# Patient Record
Sex: FEMALE | Race: WHITE
[De-identification: names, ages, dates, MRNs, and addresses within clinical notes are randomized per-mention and may not be internally consistent; named-entity substitution may affect disease eponyms.]

---

## 2018-04-19 ENCOUNTER — HOSPITAL ENCOUNTER (OUTPATIENT)
Dept: HOSPITAL 92 - LABBT | Age: 28
Discharge: HOME | End: 2018-04-19
Attending: SURGERY
Payer: COMMERCIAL

## 2018-04-19 DIAGNOSIS — Z01.810: Primary | ICD-10-CM

## 2018-04-19 DIAGNOSIS — K80.20: ICD-10-CM

## 2018-04-19 LAB
ALBUMIN SERPL BCG-MCNC: 4.2 G/DL (ref 3.5–5)
ALP SERPL-CCNC: 45 U/L (ref 40–150)
ALT SERPL W P-5'-P-CCNC: 31 U/L (ref 8–55)
ANION GAP SERPL CALC-SCNC: 15 MMOL/L (ref 10–20)
AST SERPL-CCNC: 21 U/L (ref 5–34)
BASOPHILS # BLD AUTO: 0.1 THOU/UL (ref 0–0.2)
BASOPHILS NFR BLD AUTO: 0.8 % (ref 0–1)
BILIRUB SERPL-MCNC: 0.9 MG/DL (ref 0.2–1.2)
BUN SERPL-MCNC: 15 MG/DL (ref 7–18.7)
CALCIUM SERPL-MCNC: 9.2 MG/DL (ref 7.8–10.44)
CHLORIDE SERPL-SCNC: 107 MMOL/L (ref 98–107)
CO2 SERPL-SCNC: 22 MMOL/L (ref 22–29)
CREAT CL PREDICTED SERPL C-G-VRATE: 0 ML/MIN (ref 70–130)
EOSINOPHIL # BLD AUTO: 0.6 THOU/UL (ref 0–0.7)
EOSINOPHIL NFR BLD AUTO: 7.8 % (ref 0–10)
GLOBULIN SER CALC-MCNC: 2.7 G/DL (ref 2.4–3.5)
GLUCOSE SERPL-MCNC: 83 MG/DL (ref 70–105)
HGB BLD-MCNC: 15.4 G/DL (ref 12–16)
LYMPHOCYTES # BLD: 1.2 THOU/UL (ref 1.2–3.4)
LYMPHOCYTES NFR BLD AUTO: 17.4 % (ref 21–51)
MCH RBC QN AUTO: 32.2 PG (ref 27–31)
MCV RBC AUTO: 92 FL (ref 81–99)
MONOCYTES # BLD AUTO: 0.4 THOU/UL (ref 0.11–0.59)
MONOCYTES NFR BLD AUTO: 5.9 % (ref 0–10)
NEUTROPHILS # BLD AUTO: 4.9 THOU/UL (ref 1.4–6.5)
NEUTROPHILS NFR BLD AUTO: 68.1 % (ref 42–75)
PLATELET # BLD AUTO: 242 THOU/UL (ref 130–400)
POTASSIUM SERPL-SCNC: 4.1 MMOL/L (ref 3.5–5.1)
PREGS CONTROL BACKGROUND?: (no result)
PREGS CONTROL BAR APPEAR?: YES
RBC # BLD AUTO: 4.79 MILL/UL (ref 4.2–5.4)
SODIUM SERPL-SCNC: 140 MMOL/L (ref 136–145)
WBC # BLD AUTO: 7.1 THOU/UL (ref 4.8–10.8)

## 2018-04-19 PROCEDURE — 80053 COMPREHEN METABOLIC PANEL: CPT

## 2018-04-19 PROCEDURE — 85025 COMPLETE CBC W/AUTO DIFF WBC: CPT

## 2018-04-19 PROCEDURE — 84703 CHORIONIC GONADOTROPIN ASSAY: CPT

## 2018-04-27 ENCOUNTER — HOSPITAL ENCOUNTER (OUTPATIENT)
Dept: HOSPITAL 92 - SDC | Age: 28
Discharge: HOME | End: 2018-04-27
Attending: SURGERY
Payer: COMMERCIAL

## 2018-04-27 VITALS — BODY MASS INDEX: 39.6 KG/M2

## 2018-04-27 DIAGNOSIS — E28.2: ICD-10-CM

## 2018-04-27 DIAGNOSIS — E03.9: ICD-10-CM

## 2018-04-27 DIAGNOSIS — F32.9: ICD-10-CM

## 2018-04-27 DIAGNOSIS — F41.9: ICD-10-CM

## 2018-04-27 DIAGNOSIS — K80.10: Primary | ICD-10-CM

## 2018-04-27 PROCEDURE — 96374 THER/PROPH/DIAG INJ IV PUSH: CPT

## 2018-04-27 PROCEDURE — 88304 TISSUE EXAM BY PATHOLOGIST: CPT

## 2018-04-27 PROCEDURE — 0FT44ZZ RESECTION OF GALLBLADDER, PERCUTANEOUS ENDOSCOPIC APPROACH: ICD-10-PCS | Performed by: SURGERY

## 2018-04-30 NOTE — PDOC.OP
Operative Note





- Operative Note


Operative Note: 





PROCEDURE: Laparoscopic cholecystectomy





SURGEON: Nereyda Reynoso M.D.





DATE OF PROCEDURE: 4/27/2018





PREOPERATIVE DIAGNOSIS: Cholelithiasis and cholecystitis





POSTOPERATIVE DIAGNOSIS: Cholelithiasis and cholecystitis





HISTORY: Patient with gallstones and chronic cholecystitis symptoms. No biliary 

dilatation or bile duct dilation.





FINDINGS: White walled gallbladder without significant adhesions containing 

multiple gallstones.





PROCEDURE IN DETAIL:  After informed consent was obtained and appropriate 

preoperative antibiotics were administered, the patient was taken to the 

operating room and placed in the supine position and general endotracheal 

anesthesia was administered.  The stomach was decompressed with an OG tube and 

the abdomen was prepped and draped in standard sterile fashion.  Local 

anesthesia was infused to the skin and subcutaneous tissues at the umbilical 

level.  A transverse skin incision was made.  The fascia was elevated and a 

Veress needle was placed into the abdominal cavity without difficulty.  Opening 

pressure was less than 5 and carbon dioxide gas easily insufflated to an intra-

abdominal pressure of 15, which the patient tolerated well.  The Veress needle 

was withdrawn and a [place] advanced under direct vision. The abdominal 

cavity was carefully examined.  There was no evidence of Veress needle or of 

trocar injury.  Local anesthesia was infused to the skin and subcutaneous 

tissues at the epigastric, right upper quadrant, and right lateral abdominal 

sites and trocars were placed under direct vision of the laparoscope.  The 

fundus of the gallbladder was grasped and retracted superiorly.  The 

infundibulum was grasped and retracted laterally.  The serosa was stripped 

inferiorly at the level of the neck of the gallbladder exposing the cystic duct 

and artery which were traced clearly to their insertion in the gallbladder.  

Critical view of safety was obtained and the cystic duct and artery were 

clipped and divided between clips.  The gallbladder was then dissected free of 

the gallbladder bed using hook electrocautery.  Prior to complete removal of 

the gallbladder from the gallbladder bed, the area of the cystic duct and 

artery stumps was examined.  The clips were in good position completely across 

these structures and there was no bleeding and no leakage of bile. The 

gallbladder was then placed into an EndoCatch bag and drawn out through the 

epigastric incision.  The epigastric trocar was replaced and the operative site 

easily irrigated to clear. There was no significant bleeding or spillage of 

bile. The epigastric trocar was removed and the fascia closed under direct 

laparoscopic vision with a 0 Vicryl suture on a GraNee needle in a figure-of-

eight manner with excellent technical result.  The right upper quadrant and 

right lateral abdominal trocars were removed and hemostasis verified.  Carbon 

dioxide gas was allowed to desufflate through the umbilical trocar which was 

then removed. The skin incisions were closed with 4-0 subcuticular Monocryl 

sutures and Dermabond  dressings were placed.   The patient was extubated and 

taken to the recovery room in good condition.  There were no complications.  





ESTIMATED BLOOD LOSS:  Minimal.





SPECIMEN : Gallbladder and contents.